# Patient Record
Sex: FEMALE | Race: WHITE | Employment: FULL TIME | URBAN - METROPOLITAN AREA
[De-identification: names, ages, dates, MRNs, and addresses within clinical notes are randomized per-mention and may not be internally consistent; named-entity substitution may affect disease eponyms.]

---

## 2022-10-28 ENCOUNTER — HOSPITAL ENCOUNTER (EMERGENCY)
Facility: CLINIC | Age: 62
Discharge: HOME | End: 2022-10-28
Attending: EMERGENCY MEDICINE
Payer: COMMERCIAL

## 2022-10-28 VITALS
DIASTOLIC BLOOD PRESSURE: 99 MMHG | WEIGHT: 230 LBS | TEMPERATURE: 97.9 F | HEIGHT: 59 IN | SYSTOLIC BLOOD PRESSURE: 161 MMHG | RESPIRATION RATE: 20 BRPM | OXYGEN SATURATION: 97 % | BODY MASS INDEX: 46.37 KG/M2 | HEART RATE: 120 BPM

## 2022-10-28 DIAGNOSIS — R04.0 EPISTAXIS: Primary | ICD-10-CM

## 2022-10-28 PROCEDURE — 99282 EMERGENCY DEPT VISIT SF MDM: CPT

## 2022-10-28 RX ORDER — METFORMIN HYDROCHLORIDE 500 MG/1
500 TABLET, EXTENDED RELEASE ORAL
COMMUNITY

## 2022-10-28 RX ORDER — LOSARTAN POTASSIUM 100 MG/1
100 TABLET ORAL DAILY
COMMUNITY

## 2022-10-28 RX ORDER — ATORVASTATIN CALCIUM 40 MG/1
40 TABLET, FILM COATED ORAL DAILY
COMMUNITY

## 2022-10-28 NOTE — ED PROVIDER NOTES
eMERGENCY dEPARTMENT eNCOUnter      Pt Name: Janeth Smiley  MRN: [id number]  [place] 1960  Date of evaluation: 10/28/2022      CHIEF COMPLAINT       Chief Complaint   Patient presents with    Epistaxis         HISTORY OF PRESENT ILLNESS    Janeth Smiley is a 60 y.o. female who presents with nosebleed. Patient states she woke about 1 AM to go to the bathroom noticed her nose was bleeding she continued to have problems throughout finally called [facility] came and brought her here for evaluation she denies associated chest pain shortness of breath fever chills states she really has not had any problems a while ago she did but then when she started increasing limiting her house there was not an issue        REVIEW OF SYSTEMS       Review of systems are all reviewed and negative except stated above in HPI    [address]    has no past medical history on file. SURGICAL HISTORY      has no past surgical history on file. CURRENT MEDICATIONS       Discharge Medication List as of 10/28/2022  4:23 AM        CONTINUE these medications which have NOT CHANGED    Details   metFORMIN (GLUCOPHAGE-XR) 500 MG extended release tablet Take 500 mg by mouth daily (with breakfast)Historical Med      losartan (COZAAR) 100 MG tablet Take 100 mg by mouth dailyHistorical Med      atorvastatin (LIPITOR) 40 MG tablet Take 40 mg by mouth dailyHistorical Med             ALLERGIES     has No Known Allergies. FAMILY HISTORY     has no family status information on file. family history is not on file. SOCIAL HISTORY      reports that she has never smoked. She has never used smokeless tobacco. She reports that she does not drink alcohol and does not use drugs. PHYSICAL EXAM     INITIAL VITALS:  height is 4' 11\" (1.499 m) and weight is 104.3 kg (230 lb). Her oral temperature is 97.9 °F (36.6 °C). Her blood pressure is 161/99 (abnormal) and her pulse is 120 (abnormal). Her respiration is 20 and oxygen saturation is 97%. General: Patient alert nontoxic-appearing female in no apparent distress  HEENT: Head is atraumatic conjunctiva are clear nose clip was removed from patient examination revealed some old blood on the right nares no active bleeding no active source mouth shows moist mucous membranes posterior pharynx without airway compromise or blood  Respiratory: Lung sounds are clear bilateral  Cardiac: Heart is regular rate and rhythm    DIFFERENTIAL DIAGNOSIS/ MDM:     Epistaxis    DIAGNOSTIC RESULTS     EKG: All EKG's are interpreted by the Emergency Department Physician who either signs or Co-signs this chart in the absence of a cardiologist.        RADIOLOGY:   I directly visualized the following  images and reviewed the radiologist interpretations:  No orders to display         LABS:  Labs Reviewed - No data to display      EMERGENCY DEPARTMENT COURSE:   Vitals:    Vitals:    10/28/22 0327   BP: (!) 161/99   Pulse: (!) 120   Resp: 20   Temp: 97.9 °F (36.6 °C)   TempSrc: Oral   SpO2: 97%   Weight: 104.3 kg (230 lb)   Height: 4' 11\" (1.499 m)     -------------------------  BP: (!) 161/99, Temp: 97.9 °F (36.6 °C), Heart Rate: (!) 120, Resp: 20    No orders of the defined types were placed in this encounter. Re-evaluation Notes    Patient was observed for well over 20 minutes after this with no return of bleeding heart rate was obviously less than the 120 documented as she was initially fairly anxious this time she was given general instructions about nosebleed sent home with a clamp follow-up as directed return if worse    CRITICAL CARE:   None      CONSULTS:      PROCEDURES:  None    FINAL IMPRESSION      1. Epistaxis          DISPOSITION/PLAN   DISPOSITION Decision To Discharge 10/28/2022 04:06:44 AM      Condition on Disposition    Stable    PATIENT REFERRED TO:  No follow-up provider specified.     DISCHARGE MEDICATIONS:  Discharge Medication List as of 10/28/2022  4:23 AM          (Please note that portions of this note were completed with a voice recognition program.  Efforts were made to edit the dictations but occasionally words are mis-transcribed.)    Josesito Lagunas MD,, MD, F.A.C.E.P.   Attending Emergency Physician        Josesito Lagunas MD  10/28/22 1442

## 2022-10-28 NOTE — ED NOTES
Pt presents to ED ambulatory a&o x4. Pt states she woke up around 0100 with a nose bleed and she has been unable to get it to stop. Pt states she does not take blood thinners but does take 81mg of asa some days and did take one yesterday morning. Pt has hx of this in the past. Denies injury.       Gregg Henao RN  10/28/22 3382